# Patient Record
Sex: MALE | Race: WHITE | NOT HISPANIC OR LATINO | Employment: UNEMPLOYED | RURAL
[De-identification: names, ages, dates, MRNs, and addresses within clinical notes are randomized per-mention and may not be internally consistent; named-entity substitution may affect disease eponyms.]

---

## 2020-11-14 ENCOUNTER — HISTORICAL (OUTPATIENT)
Dept: ADMINISTRATIVE | Facility: HOSPITAL | Age: 60
End: 2020-11-14

## 2020-11-14 LAB
ABO: NORMAL
ALBUMIN SERPL BCP-MCNC: 3.4 G/DL (ref 3.5–5)
ALBUMIN/GLOB SERPL: 0.7 {RATIO}
ALP SERPL-CCNC: 160 U/L (ref 45–115)
ALT SERPL W P-5'-P-CCNC: 29 U/L (ref 16–61)
AMPHET UR QL SCN: NEGATIVE NG/ML
ANION GAP SERPL CALCULATED.3IONS-SCNC: 18 MMOL/L
ANTIBODY SCREEN: NORMAL
APTT PPP: 29.8 SECONDS (ref 25.2–37.3)
AST SERPL W P-5'-P-CCNC: 49 U/L (ref 15–37)
BARBITURATES UR QL SCN: NEGATIVE NG/ML
BASOPHILS # BLD AUTO: 0.03 X10E3/UL (ref 0–0.2)
BASOPHILS NFR BLD AUTO: 0.4 % (ref 0–1)
BENZODIAZ METAB UR QL SCN: NEGATIVE NG/ML
BILIRUB SERPL-MCNC: 0.5 MG/DL (ref 0–1.2)
BILIRUB UR QL STRIP: NEGATIVE MG/DL
BUN SERPL-MCNC: 6 MG/DL (ref 7–18)
BUN/CREAT SERPL: 6.2
CALCIUM SERPL-MCNC: 8.7 MG/DL (ref 8.5–10.1)
CANNABINOIDS UR QL SCN: NEGATIVE NG/ML
CHLORIDE SERPL-SCNC: 90 MMOL/L (ref 98–107)
CLARITY UR: CLEAR
CO2 SERPL-SCNC: 23 MMOL/L (ref 21–32)
COCAINE UR QL SCN: NEGATIVE NG/ML
COLOR UR: ABNORMAL
CREAT SERPL-MCNC: 0.97 MG/DL (ref 0.7–1.3)
EOSINOPHIL # BLD AUTO: 0.07 X10E3/UL (ref 0–0.5)
EOSINOPHIL NFR BLD AUTO: 1 % (ref 1–4)
ERYTHROCYTE [DISTWIDTH] IN BLOOD BY AUTOMATED COUNT: 11.3 % (ref 11.5–14.5)
ETHANOL SERPL-MCNC: 83 MG/DL (ref 0–10)
FOLATE SERPL-MCNC: >20 NG/ML (ref 3.1–17.5)
GLOBULIN SER-MCNC: 5.1 G/DL (ref 2–4)
GLUCOSE SERPL-MCNC: 111 MG/DL (ref 70–105)
GLUCOSE SERPL-MCNC: 120 MG/DL (ref 70–105)
GLUCOSE SERPL-MCNC: 128 MG/DL (ref 74–106)
GLUCOSE UR STRIP-MCNC: NEGATIVE MG/DL
HCT VFR BLD AUTO: 29.9 % (ref 40–54)
HGB BLD-MCNC: 10.8 G/DL (ref 13.5–18)
IMM GRANULOCYTES # BLD AUTO: 0.01 X10E3/UL (ref 0–0.04)
IMM GRANULOCYTES NFR BLD: 0.1 % (ref 0–0.4)
INR BLD: 1.04 (ref 0–3.3)
KETONES UR STRIP-SCNC: NEGATIVE MG/DL
LACTATE SERPL-SCNC: 2.4 MMOL/L (ref 0.4–2)
LACTATE SERPL-SCNC: 2.4 MMOL/L (ref 0.4–2)
LEUKOCYTE ESTERASE UR QL STRIP: NEGATIVE LEU/UL
LYMPHOCYTES # BLD AUTO: 1.6 X10E3/UL (ref 1–4.8)
LYMPHOCYTES NFR BLD AUTO: 23.2 % (ref 27–41)
MCH RBC QN AUTO: 36.2 PG (ref 27–31)
MCHC RBC AUTO-ENTMCNC: 36.1 G/DL (ref 32–36)
MCV RBC AUTO: 100.3 FL (ref 80–96)
MONOCYTES # BLD AUTO: 0.74 X10E3/UL (ref 0–0.8)
MONOCYTES NFR BLD AUTO: 10.7 % (ref 2–6)
MPC BLD CALC-MCNC: 9 FL (ref 9.4–12.4)
NEUTROPHILS # BLD AUTO: 4.44 X10E3/UL (ref 1.8–7.7)
NEUTROPHILS NFR BLD AUTO: 64.6 % (ref 53–65)
NITRITE UR QL STRIP: NEGATIVE
NRBC # BLD AUTO: 0 X10E3/UL (ref 0–0)
NRBC, AUTO (.00): 0 /100 (ref 0–0)
OPIATES UR QL SCN: NEGATIVE NG/ML
PCP UR QL SCN: NEGATIVE NG/ML
PH UR STRIP: 6 PH UNITS (ref 5–8)
PLATELET # BLD AUTO: 208 X10E3/UL (ref 150–400)
POTASSIUM SERPL-SCNC: 2.8 MMOL/L (ref 3.5–5.1)
PROLACTIN SERPL-MCNC: 31.5 NG/ML
PROT SERPL-MCNC: 8.5 G/DL (ref 6.4–8.2)
PROT UR QL STRIP: NEGATIVE MG/DL
PROTHROMBIN TIME: 13.1 SECONDS (ref 11.7–14.7)
RBC # BLD AUTO: 2.98 X10E6/UL (ref 4.6–6.2)
RBC # UR STRIP: NEGATIVE ERY/UL
RH TYPE: NORMAL
SARS-COV-2 RNA AMPLIFICATION, QUAL: NEGATIVE
SODIUM SERPL-SCNC: 128 MMOL/L (ref 136–145)
SP GR UR STRIP: 1.01 (ref 1–1.03)
TROPONIN I SERPL-MCNC: <0.017 NG/ML (ref 0–0.06)
TSH SERPL DL<=0.005 MIU/L-ACNC: 2.61 UIU/ML (ref 0.36–3.74)
UROBILINOGEN UR STRIP-ACNC: 0.2 EU/DL
VIT B12 SERPL-MCNC: 199 PG/ML (ref 193–986)
WBC # BLD AUTO: 6.89 X10E3/UL (ref 4.5–11)

## 2020-11-15 ENCOUNTER — HISTORICAL (OUTPATIENT)
Dept: ADMINISTRATIVE | Facility: HOSPITAL | Age: 60
End: 2020-11-15

## 2020-11-15 LAB
ANION GAP SERPL CALCULATED.3IONS-SCNC: 19 MMOL/L
ANISOCYTOSIS BLD QL SMEAR: ABNORMAL
BASOPHILS # BLD AUTO: 0 X10E3/UL (ref 0–0.2)
BASOPHILS NFR BLD AUTO: 0 % (ref 0–1)
BUN SERPL-MCNC: 7 MG/DL (ref 7–18)
CALCIUM SERPL-MCNC: 8.8 MG/DL (ref 8.5–10.1)
CHLORIDE SERPL-SCNC: 97 MMOL/L (ref 98–107)
CHOLEST SERPL-MCNC: 133 MG/DL
CHOLEST/HDLC SERPL: 2.4 {RATIO}
CO2 SERPL-SCNC: 22 MMOL/L (ref 21–32)
CREAT SERPL-MCNC: 0.91 MG/DL (ref 0.7–1.3)
EOSINOPHIL # BLD AUTO: 0 X10E3/UL (ref 0–0.5)
EOSINOPHIL NFR BLD AUTO: 0 % (ref 1–4)
ERYTHROCYTE [DISTWIDTH] IN BLOOD BY AUTOMATED COUNT: 11.6 % (ref 11.5–14.5)
GLUCOSE SERPL-MCNC: 121 MG/DL (ref 74–106)
HCT VFR BLD AUTO: 31.3 % (ref 40–54)
HDLC SERPL-MCNC: 56 MG/DL
HGB BLD-MCNC: 11 G/DL (ref 13.5–18)
IMM GRANULOCYTES # BLD AUTO: 0.02 X10E3/UL (ref 0–0.04)
IMM GRANULOCYTES NFR BLD: 0.4 % (ref 0–0.4)
LDLC SERPL CALC-MCNC: 68 MG/DL
LYMPHOCYTES # BLD AUTO: 0.4 X10E3/UL (ref 1–4.8)
LYMPHOCYTES NFR BLD AUTO: 7.6 % (ref 27–41)
LYMPHOCYTES NFR BLD MANUAL: 6 % (ref 27–41)
MACROCYTES BLD QL SMEAR: ABNORMAL
MAGNESIUM SERPL-MCNC: 1.8 MG/DL (ref 1.7–2.3)
MCH RBC QN AUTO: 36.3 PG (ref 27–31)
MCHC RBC AUTO-ENTMCNC: 35.1 G/DL (ref 32–36)
MCV RBC AUTO: 103.3 FL (ref 80–96)
MONOCYTES # BLD AUTO: 0.06 X10E3/UL (ref 0–0.8)
MONOCYTES NFR BLD AUTO: 1.1 % (ref 2–6)
MONOCYTES NFR BLD MANUAL: 1 % (ref 2–6)
MPC BLD CALC-MCNC: 9.9 FL (ref 9.4–12.4)
NEUTROPHILS # BLD AUTO: 4.75 X10E3/UL (ref 1.8–7.7)
NEUTROPHILS NFR BLD AUTO: 90.9 % (ref 53–65)
NEUTS SEG NFR BLD MANUAL: 93 % (ref 50–62)
NRBC # BLD AUTO: 0 X10E3/UL (ref 0–0)
NRBC, AUTO (.00): 0 /100 (ref 0–0)
PLATELET # BLD AUTO: 198 X10E3/UL (ref 150–400)
PLATELET MORPHOLOGY: NORMAL
POTASSIUM SERPL-SCNC: 4.7 MMOL/L (ref 3.5–5.1)
RBC # BLD AUTO: 3.03 X10E6/UL (ref 4.6–6.2)
SODIUM SERPL-SCNC: 133 MMOL/L (ref 136–145)
TRIGL SERPL-MCNC: 47 MG/DL
WBC # BLD AUTO: 5.23 X10E3/UL (ref 4.5–11)

## 2020-11-16 ENCOUNTER — HISTORICAL (OUTPATIENT)
Dept: ADMINISTRATIVE | Facility: HOSPITAL | Age: 60
End: 2020-11-16

## 2020-11-16 LAB
ANION GAP SERPL CALCULATED.3IONS-SCNC: 15 MMOL/L
ANISOCYTOSIS BLD QL SMEAR: ABNORMAL
BASOPHILS # BLD AUTO: 0.02 X10E3/UL (ref 0–0.2)
BASOPHILS NFR BLD AUTO: 0.1 % (ref 0–1)
BUN SERPL-MCNC: 11 MG/DL (ref 7–18)
CALCIUM SERPL-MCNC: 9.1 MG/DL (ref 8.5–10.1)
CHLORIDE SERPL-SCNC: 94 MMOL/L (ref 98–107)
CO2 SERPL-SCNC: 27 MMOL/L (ref 21–32)
CREAT SERPL-MCNC: 1.04 MG/DL (ref 0.7–1.3)
EOSINOPHIL # BLD AUTO: 0.01 X10E3/UL (ref 0–0.5)
EOSINOPHIL NFR BLD AUTO: 0.1 % (ref 1–4)
ERYTHROCYTE [DISTWIDTH] IN BLOOD BY AUTOMATED COUNT: 11.9 % (ref 11.5–14.5)
GLUCOSE SERPL-MCNC: 114 MG/DL (ref 74–106)
HCT VFR BLD AUTO: 30.4 % (ref 40–54)
HGB BLD-MCNC: 10.3 G/DL (ref 13.5–18)
IMM GRANULOCYTES # BLD AUTO: 0.08 X10E3/UL (ref 0–0.04)
IMM GRANULOCYTES NFR BLD: 0.5 % (ref 0–0.4)
LYMPHOCYTES # BLD AUTO: 0.63 X10E3/UL (ref 1–4.8)
LYMPHOCYTES NFR BLD AUTO: 4.1 % (ref 27–41)
LYMPHOCYTES NFR BLD MANUAL: 2 % (ref 27–41)
MACROCYTES BLD QL SMEAR: ABNORMAL
MAGNESIUM SERPL-MCNC: 1.6 MG/DL (ref 1.7–2.3)
MCH RBC QN AUTO: 34.9 PG (ref 27–31)
MCHC RBC AUTO-ENTMCNC: 33.9 G/DL (ref 32–36)
MCV RBC AUTO: 103.1 FL (ref 80–96)
MONOCYTES # BLD AUTO: 0.53 X10E3/UL (ref 0–0.8)
MONOCYTES NFR BLD AUTO: 3.4 % (ref 2–6)
MONOCYTES NFR BLD MANUAL: 3 % (ref 2–6)
MPC BLD CALC-MCNC: 9.8 FL (ref 9.4–12.4)
NEUTROPHILS # BLD AUTO: 14.22 X10E3/UL (ref 1.8–7.7)
NEUTROPHILS NFR BLD AUTO: 91.8 % (ref 53–65)
NEUTS SEG NFR BLD MANUAL: 95 % (ref 50–62)
NRBC # BLD AUTO: 0 X10E3/UL (ref 0–0)
NRBC, AUTO (.00): 0 /100 (ref 0–0)
PLATELET # BLD AUTO: 198 X10E3/UL (ref 150–400)
PLATELET MORPHOLOGY: NORMAL
POTASSIUM SERPL-SCNC: 3.8 MMOL/L (ref 3.5–5.1)
RBC # BLD AUTO: 2.95 X10E6/UL (ref 4.6–6.2)
SODIUM SERPL-SCNC: 132 MMOL/L (ref 136–145)
WBC # BLD AUTO: 15.49 X10E3/UL (ref 4.5–11)

## 2020-11-19 LAB — VIT B1 BLD-SCNC: 60 NMOL/L (ref 70–180)

## 2022-08-09 ENCOUNTER — OFFICE VISIT (OUTPATIENT)
Dept: CARDIOLOGY | Facility: CLINIC | Age: 62
End: 2022-08-09
Payer: OTHER GOVERNMENT

## 2022-08-09 VITALS
SYSTOLIC BLOOD PRESSURE: 94 MMHG | HEIGHT: 70 IN | BODY MASS INDEX: 19.93 KG/M2 | RESPIRATION RATE: 12 BRPM | HEART RATE: 82 BPM | WEIGHT: 139.25 LBS | OXYGEN SATURATION: 96 % | DIASTOLIC BLOOD PRESSURE: 60 MMHG

## 2022-08-09 DIAGNOSIS — R55 SYNCOPE AND COLLAPSE: Primary | ICD-10-CM

## 2022-08-09 PROCEDURE — 99213 PR OFFICE/OUTPT VISIT, EST, LEVL III, 20-29 MIN: ICD-10-PCS | Mod: S$PBB,,, | Performed by: INTERNAL MEDICINE

## 2022-08-09 PROCEDURE — 99213 OFFICE O/P EST LOW 20 MIN: CPT | Mod: S$PBB,,, | Performed by: INTERNAL MEDICINE

## 2022-08-09 PROCEDURE — 93005 ELECTROCARDIOGRAM TRACING: CPT | Mod: PBBFAC | Performed by: INTERNAL MEDICINE

## 2022-08-09 PROCEDURE — 99215 OFFICE O/P EST HI 40 MIN: CPT | Mod: PBBFAC | Performed by: INTERNAL MEDICINE

## 2022-08-09 PROCEDURE — 93010 ELECTROCARDIOGRAM REPORT: CPT | Mod: S$PBB,,, | Performed by: INTERNAL MEDICINE

## 2022-08-09 PROCEDURE — 93010 EKG 12-LEAD: ICD-10-PCS | Mod: S$PBB,,, | Performed by: INTERNAL MEDICINE

## 2022-08-09 RX ORDER — ASPIRIN 81 MG/1
81 TABLET ORAL DAILY
COMMUNITY
Start: 2022-01-05

## 2022-08-09 RX ORDER — GABAPENTIN 300 MG/1
300 CAPSULE ORAL 2 TIMES DAILY
COMMUNITY
Start: 2022-01-05

## 2022-08-09 RX ORDER — MELATONIN 3 MG
3 CAPSULE ORAL NIGHTLY PRN
COMMUNITY
Start: 2022-01-05

## 2022-08-09 RX ORDER — FERROUS GLUCONATE 324(38)MG
324 TABLET ORAL DAILY
COMMUNITY
Start: 2022-01-05

## 2022-08-09 RX ORDER — 1,2-PENTANEDIOL 100 %
1 LIQUID (ML) MISCELLANEOUS DAILY
COMMUNITY
Start: 2022-01-05

## 2022-08-09 RX ORDER — SERTRALINE HYDROCHLORIDE 100 MG/1
100 TABLET, FILM COATED ORAL DAILY
COMMUNITY
Start: 2022-01-05

## 2022-08-09 RX ORDER — TRAZODONE HYDROCHLORIDE 50 MG/1
50 TABLET ORAL NIGHTLY
COMMUNITY
Start: 2022-01-05

## 2022-08-09 RX ORDER — LOVASTATIN 40 MG/1
20 TABLET ORAL DAILY
COMMUNITY
Start: 2022-01-05

## 2022-08-09 RX ORDER — MIRTAZAPINE 45 MG/1
45 TABLET, FILM COATED ORAL NIGHTLY
COMMUNITY
Start: 2022-01-05

## 2022-08-15 NOTE — PROGRESS NOTES
[facility] note        DATE OF SERVICE: 08/16/2022       PCP: Primary Doctor No      CHIEF COMPLAINT:   Chief Complaint   Patient presents with    [facility]     [facility]     Loss of Consciousness     Numerous times over the last 2 years.     Shortness of Breath     All the time.         HISTORY OF PRESENT ILLNESS:  Troy Kendrick is a 62 y.o. male with a PMH of   Past Medical History:   Diagnosis Date    Coronary artery disease     Hyperlipidemia     Syncope and collapse      who presents for   Chief Complaint   Patient presents with    [facility]     [facility]     Loss of Consciousness     Numerous times over the last 2 years.     Shortness of Breath     All the time.           Review of Systems: Review of Systems   Respiratory: Positive for shortness of breath.    Cardiovascular: Negative for chest pain, palpitations and leg swelling.   Neurological: Positive for loss of consciousness.        PAST MEDICAL HISTORY:  Past Medical History:   Diagnosis Date    Coronary artery disease     Hyperlipidemia     Syncope and collapse        PAST SURGICAL HISTORY:  Past Surgical History:   Procedure Laterality Date    CORONARY ARTERY BYPASS GRAFT      LEFT HEART CATHETERIZATION  2018 -[place]       SOCIAL HISTORY:  Social History     Socioeconomic History    Marital status:        FAMILY HISTORY:  No family history on file.      ALLERGIES:  Review of patient's allergies indicates:  No Known Allergies     MEDICATIONS:    Current Outpatient Medications:     1,2-pentanediol, bulk, 100 % Liqd, Take 1 mg by mouth once daily at 6am., Disp: , Rfl:     aspirin (ECOTRIN) 81 MG EC tablet, Take 81 mg by mouth once daily at 6am., Disp: , Rfl:     ferrous gluconate (FERGON) 324 MG tablet, Take 324 mg by mouth once daily at 6am., Disp: , Rfl:     gabapentin (NEURONTIN) 300 MG capsule, Take 300 mg by mouth 2 (two) times a day., Disp: , Rfl:     lovastatin (MEVACOR)  "40 MG tablet, Take 20 mg by mouth once daily at 6am., Disp: , Rfl:     melatonin 3 mg Cap, Take 3 mg by mouth nightly as needed., Disp: , Rfl:     metoprolol succinate 100 mg CSpX, Take 50 mg by mouth once daily at 6am., Disp: , Rfl:     mirtazapine (REMERON) 45 MG tablet, Take 45 mg by mouth every evening., Disp: , Rfl:     sertraline (ZOLOFT) 100 MG tablet, Take 100 mg by mouth once daily at 6am., Disp: , Rfl:     traZODone (DESYREL) 50 MG tablet, Take 50 mg by mouth every evening., Disp: , Rfl:      PHYSICAL EXAM:  BP 94/60 (BP Location: Left arm, Patient Position: Standing, BP Method: Medium (Manual))   Pulse 82   Resp 12   Ht 5' 10" (1.778 m)   Wt 63.2 kg (139 lb 4 oz)   SpO2 96%   BMI 19.98 kg/m²   Wt Readings from Last 3 Encounters:   08/09/22 63.2 kg (139 lb 4 oz)      Body mass index is 19.98 kg/m².    Physical Exam  Vitals reviewed.   Constitutional:       Appearance: Normal appearance.   HENT:      Head: Normocephalic and atraumatic.   Neck:      Vascular: No carotid bruit or JVD.   Cardiovascular:      Rate and Rhythm: Normal rate and regular rhythm.      Pulses:           Radial pulses are 1+ on the right side and 1+ on the left side.        Dorsalis pedis pulses are 1+ on the right side and 1+ on the left side.      Heart sounds: Normal heart sounds.   Pulmonary:      Effort: Pulmonary effort is normal.      Breath sounds: Normal breath sounds.   Musculoskeletal:      Right lower leg: No edema.      Left lower leg: No edema.   Skin:     General: Skin is warm and dry.   Neurological:      Mental Status: He is alert.         LABS REVIEWED:  Lab Results   Component Value Date    WBC 15.49 (H) 11/16/2020    RBC 2.95 (L) 11/16/2020    HGB 10.3 (L) 11/16/2020    HCT 30.4 (L) 11/16/2020    .1 (H) 11/16/2020    MCH 34.9 (H) 11/16/2020    MCHC 33.9 11/16/2020    RDW 11.9 11/16/2020     11/16/2020    MPV 9.8 11/16/2020    NRBC 0.0 11/16/2020    INR 1.04 11/14/2020     Lab Results "   Component Value Date     (L) 11/16/2020    K 3.8 11/16/2020    CL 94 (L) 11/16/2020    CO2 27 11/16/2020    BUN 11 11/16/2020    MG 1.6 (L) 11/16/2020     Lab Results   Component Value Date    AST 49 (H) 11/14/2020    ALT 29 11/14/2020     Lab Results   Component Value Date     (H) 11/16/2020     Lab Results   Component Value Date    CHOL 133 11/15/2020    HDL 56 11/15/2020    TRIG 47 11/15/2020    CHOLHDL 2.4 11/15/2020       CARDIAC STUDIES REVIEWED:EKG: NSR, septal infarct-age undetermined, 72 bpm    ASSESSMENT: c/o orthostatic dizziness    VISIT DIAGNOSIS:  Syncope and collapse  -     EKG 12-lead; Future  -     Echo; Future; Expected date: 08/23/2022  -     X-Ray Chest PA And Lateral; Future; Expected date: 08/09/2022  -     NM Myocardial Perfusion Spect Multi Pharmacologic; Future; Expected date: 08/23/2022  -     Nuclear Stress Test; Future; Expected date: 08/23/2022  -     CBC Auto Differential; Future; Expected date: 08/09/2022  -     Comprehensive Metabolic Panel; Future; Expected date: 08/09/2022  -     TSH; Future; Expected date: 08/09/2022  -     Hemoglobin A1C; Future; Expected date: 08/09/2022  -     Urinalysis, Reflex to Urine Culture; Future; Expected date: 08/09/2022         PLAN: orthostatic vs  Orders Placed This Encounter   Procedures    X-Ray Chest PA And Lateral     Standing Status:   Future     Standing Expiration Date:   8/9/2023     Order Specific Question:   May the Radiologist modify the order per protocol to meet the clinical needs of the patient?     Answer:   Yes     Order Specific Question:   Release to patient     Answer:   Immediate    NM Myocardial Perfusion Spect Multi Pharmacologic     Standing Status:   Future     Standing Expiration Date:   9/9/2022     Order Specific Question:   May the Radiologist modify the order per protocol to meet the clinical needs of the patient?     Answer:   Yes     Order Specific Question:   Stress Medication to use:     Answer:    Regadenoson     Order Specific Question:   Diabetes?     Answer:   No     Order Specific Question:   Will a Cardiologist read this study?     Answer:   Yes    CBC Auto Differential     Standing Status:   Future     Standing Expiration Date:   10/8/2023    Comprehensive Metabolic Panel     Standing Status:   Future     Standing Expiration Date:   10/8/2023    TSH     Standing Status:   Future     Standing Expiration Date:   10/8/2023    Hemoglobin A1C     Standing Status:   Future     Standing Expiration Date:   10/8/2023    Urinalysis, Reflex to Urine Culture     Standing Status:   Future     Standing Expiration Date:   10/8/2023     Order Specific Question:   Specimen Source     Answer:   Urine    Nuclear Stress Test     Standing Status:   Future     Standing Expiration Date:   9/9/2022     Order Specific Question:   Which stress agent will be used?     Answer:   Pharm     Order Specific Question:   Which medicaton for the stress procedure?     Answer:   Regadenoson     Order Specific Question:   Physician to read study:     Answer:   GREGORY GRIFFIN [[id number]]     Order Specific Question:   Release to patient     Answer:   Immediate    EKG 12-lead     Standing Status:   Future     Number of Occurrences:   1     Standing Expiration Date:   8/9/2023    Echo     Standing Status:   Future     Standing Expiration Date:   9/9/2022     Order Specific Question:   Color Doppler?     Answer:   Yes     Order Specific Question:   Release to patient     Answer:   Immediate      RTC after tests.

## 2022-08-16 PROBLEM — R55 SYNCOPE AND COLLAPSE: Status: ACTIVE | Noted: 2022-08-16

## 2022-08-18 ENCOUNTER — HOSPITAL ENCOUNTER (OUTPATIENT)
Dept: CARDIOLOGY | Facility: HOSPITAL | Age: 62
Discharge: HOME | End: 2022-08-18
Attending: INTERNAL MEDICINE
Payer: OTHER GOVERNMENT

## 2022-08-18 ENCOUNTER — HOSPITAL ENCOUNTER (OUTPATIENT)
Dept: RADIOLOGY | Facility: HOSPITAL | Age: 62
Discharge: HOME | End: 2022-08-18
Attending: INTERNAL MEDICINE
Payer: OTHER GOVERNMENT

## 2022-08-18 VITALS — WEIGHT: 139 LBS | BODY MASS INDEX: 19.9 KG/M2 | HEIGHT: 70 IN

## 2022-08-18 DIAGNOSIS — R55 SYNCOPE AND COLLAPSE: ICD-10-CM

## 2022-08-18 LAB
CV STRESS BASE HR: 71 BPM
DIASTOLIC BLOOD PRESSURE: 94 MMHG
OHS CV CPX 1 MINUTE RECOVERY HEART RATE: 71 BPM
OHS CV CPX 85 PERCENT MAX PREDICTED HEART RATE MALE: 133
OHS CV CPX MAX PREDICTED HEART RATE: 156
OHS CV CPX PATIENT IS FEMALE: 0
OHS CV CPX PATIENT IS MALE: 1
OHS CV CPX PEAK DIASTOLIC BLOOD PRESSURE: 96 MMHG
OHS CV CPX PEAK HEAR RATE: 81 BPM
OHS CV CPX PEAK RATE PRESSURE PRODUCT: NORMAL
OHS CV CPX PEAK SYSTOLIC BLOOD PRESSURE: 143 MMHG
OHS CV CPX PERCENT MAX PREDICTED HEART RATE ACHIEVED: 52
OHS CV CPX RATE PRESSURE PRODUCT PRESENTING: NORMAL
SYSTOLIC BLOOD PRESSURE: 164 MMHG

## 2022-08-18 PROCEDURE — 93016 NUCLEAR STRESS TEST (CUPID ONLY): ICD-10-PCS | Mod: ,,, | Performed by: INTERNAL MEDICINE

## 2022-08-18 PROCEDURE — 71046 X-RAY EXAM CHEST 2 VIEWS: CPT | Mod: TC

## 2022-08-18 PROCEDURE — 63600175 PHARM REV CODE 636 W HCPCS: Performed by: INTERNAL MEDICINE

## 2022-08-18 PROCEDURE — 78452 NM MYOCARDIAL PERFUSION SPECT MULTI PHARM: ICD-10-PCS | Mod: 26,,, | Performed by: INTERNAL MEDICINE

## 2022-08-18 PROCEDURE — A9500 TC99M SESTAMIBI: HCPCS

## 2022-08-18 PROCEDURE — 93016 CV STRESS TEST SUPVJ ONLY: CPT | Mod: ,,, | Performed by: INTERNAL MEDICINE

## 2022-08-18 PROCEDURE — 93306 TTE W/DOPPLER COMPLETE: CPT | Mod: 26,,, | Performed by: INTERNAL MEDICINE

## 2022-08-18 PROCEDURE — 78452 HT MUSCLE IMAGE SPECT MULT: CPT | Mod: 26,,, | Performed by: INTERNAL MEDICINE

## 2022-08-18 PROCEDURE — 93018 NUCLEAR STRESS TEST (CUPID ONLY): ICD-10-PCS | Mod: ,,, | Performed by: INTERNAL MEDICINE

## 2022-08-18 PROCEDURE — 93306 TTE W/DOPPLER COMPLETE: CPT

## 2022-08-18 PROCEDURE — 93018 CV STRESS TEST I&R ONLY: CPT | Mod: ,,, | Performed by: INTERNAL MEDICINE

## 2022-08-18 PROCEDURE — 93306 ECHO (CUPID ONLY): ICD-10-PCS | Mod: 26,,, | Performed by: INTERNAL MEDICINE

## 2022-08-18 PROCEDURE — 93017 CV STRESS TEST TRACING ONLY: CPT

## 2022-08-18 RX ORDER — REGADENOSON 0.08 MG/ML
0.4 INJECTION, SOLUTION INTRAVENOUS ONCE
Status: COMPLETED | OUTPATIENT
Start: 2022-08-18 | End: 2022-08-18

## 2022-08-18 RX ADMIN — REGADENOSON 0.4 MG: 0.08 INJECTION, SOLUTION INTRAVENOUS at 09:08

## 2022-08-23 LAB
AORTIC VALVE CUSP SEPERATION: 17.7 CM
AV INDEX (PROSTH): 0.84
AV MEAN GRADIENT: 2 MMHG
AV PEAK GRADIENT: 4 MMHG
AV VALVE AREA: 2.65 CM2
BSA FOR ECHO PROCEDURE: 1.76 M2
CV ECHO LV RWT: 0.62 CM
DOP CALC AO PEAK VEL: 1 M/S
DOP CALC AO VTI: 20.5 CM
DOP CALC LVOT AREA: 3.2 CM2
DOP CALC LVOT DIAMETER: 2.01 CM
DOP CALC LVOT STROKE VOLUME: 54.39 CM3
DOP CALCLVOT PEAK VEL VTI: 17.15 CM
E WAVE DECELERATION TIME: 142 MSEC
E/A RATIO: 1.02
E/E' RATIO: 4.92 M/S
ECHO LV POSTERIOR WALL: 0.95 CM (ref 0.6–1.1)
EJECTION FRACTION: 55 %
FRACTIONAL SHORTENING: 33 % (ref 28–44)
INTERVENTRICULAR SEPTUM: 1.07 CM (ref 0.6–1.1)
IVC DIAMETER: 1.13 CM
LEFT ATRIUM SIZE: 2.68 CM
LEFT INTERNAL DIMENSION IN SYSTOLE: 2.07 CM (ref 2.1–4)
LEFT VENTRICLE DIASTOLIC VOLUME INDEX: 20.85 ML/M2
LEFT VENTRICLE DIASTOLIC VOLUME: 37.32 ML
LEFT VENTRICLE MASS INDEX: 48 G/M2
LEFT VENTRICLE SYSTOLIC VOLUME INDEX: 7.8 ML/M2
LEFT VENTRICLE SYSTOLIC VOLUME: 13.89 ML
LEFT VENTRICULAR INTERNAL DIMENSION IN DIASTOLE: 3.08 CM (ref 3.5–6)
LEFT VENTRICULAR MASS: 86.66 G
LV LATERAL E/E' RATIO: 4.92 M/S
LV SEPTAL E/E' RATIO: 4.92 M/S
MV PEAK A VEL: 0.63 M/S
MV PEAK E VEL: 0.64 M/S
TDI LATERAL: 0.13 M/S
TDI SEPTAL: 0.13 M/S
TDI: 0.13 M/S

## 2022-09-01 ENCOUNTER — OFFICE VISIT (OUTPATIENT)
Dept: CARDIOLOGY | Facility: CLINIC | Age: 62
End: 2022-09-01
Payer: OTHER GOVERNMENT

## 2022-09-01 VITALS
WEIGHT: 143.5 LBS | RESPIRATION RATE: 20 BRPM | BODY MASS INDEX: 20.54 KG/M2 | HEART RATE: 88 BPM | DIASTOLIC BLOOD PRESSURE: 72 MMHG | HEIGHT: 70 IN | SYSTOLIC BLOOD PRESSURE: 110 MMHG

## 2022-09-01 DIAGNOSIS — R07.9 CHEST PAIN, UNSPECIFIED TYPE: Primary | ICD-10-CM

## 2022-09-01 DIAGNOSIS — K21.9 GASTROESOPHAGEAL REFLUX DISEASE WITHOUT ESOPHAGITIS: Chronic | ICD-10-CM

## 2022-09-01 DIAGNOSIS — E78.5 HYPERLIPIDEMIA, UNSPECIFIED HYPERLIPIDEMIA TYPE: Chronic | ICD-10-CM

## 2022-09-01 PROCEDURE — 99213 OFFICE O/P EST LOW 20 MIN: CPT | Mod: PBBFAC | Performed by: INTERNAL MEDICINE

## 2022-09-01 PROCEDURE — 99213 OFFICE O/P EST LOW 20 MIN: CPT | Mod: S$PBB,,, | Performed by: INTERNAL MEDICINE

## 2022-09-01 PROCEDURE — 99213 PR OFFICE/OUTPT VISIT, EST, LEVL III, 20-29 MIN: ICD-10-PCS | Mod: S$PBB,,, | Performed by: INTERNAL MEDICINE

## 2022-09-06 PROBLEM — K21.9 GASTROESOPHAGEAL REFLUX DISEASE WITHOUT ESOPHAGITIS: Chronic | Status: ACTIVE | Noted: 2022-09-06

## 2022-09-06 PROBLEM — E78.5 HYPERLIPIDEMIA: Chronic | Status: ACTIVE | Noted: 2022-09-06

## 2022-09-06 PROBLEM — R07.9 CHEST PAIN: Status: ACTIVE | Noted: 2022-09-06

## 2022-09-06 NOTE — PROGRESS NOTES
[facility] note        DATE OF SERVICE: 09/06/2022       PCP: Primary Doctor No      CHIEF COMPLAINT:   Chief Complaint   Patient presents with    Results    Chest Pain     At times, with rest and exertion.     Shortness of Breath     All the time.    Pre Syncope     Several times since last visit.        HISTORY OF PRESENT ILLNESS:  Troy Kendrick is a 62 y.o. male with a PMH of   Past Medical History:   Diagnosis Date    Hyperlipidemia     Syncope and collapse      who presents for   Chief Complaint   Patient presents with    Results    Chest Pain     At times, with rest and exertion.     Shortness of Breath     All the time.    Pre Syncope     Several times since last visit.          Review of Systems: Review of Systems   Respiratory:  Positive for shortness of breath.    Cardiovascular:  Positive for chest pain. Negative for palpitations and leg swelling.   Neurological:  Negative for loss of consciousness.      PAST MEDICAL HISTORY:  Past Medical History:   Diagnosis Date    Hyperlipidemia     Syncope and collapse        PAST SURGICAL HISTORY:  Past Surgical History:   Procedure Laterality Date    ELBOW SURGERY Left     HERNIA REPAIR      NECK SURGERY         SOCIAL HISTORY:  Social History     Socioeconomic History    Marital status:        FAMILY HISTORY:  Family History   Problem Relation Age of Onset    Heart disease Father     Alcohol abuse Brother          ALLERGIES:  Review of patient's allergies indicates:  No Known Allergies     MEDICATIONS:    Current Outpatient Medications:     1,2-pentanediol, bulk, 100 % Liqd, Take 1 mg by mouth once daily at 6am., Disp: , Rfl:     aspirin (ECOTRIN) 81 MG EC tablet, Take 81 mg by mouth once daily at 6am., Disp: , Rfl:     ferrous gluconate (FERGON) 324 MG tablet, Take 324 mg by mouth once daily at 6am., Disp: , Rfl:     gabapentin (NEURONTIN) 300 MG capsule, Take 300 mg by mouth 2 (two) times a day., Disp: , Rfl:     lovastatin (MEVACOR) 40 MG  "tablet, Take 20 mg by mouth once daily at 6am., Disp: , Rfl:     melatonin 3 mg Cap, Take 3 mg by mouth nightly as needed., Disp: , Rfl:     metoprolol succinate 100 mg CSpX, Take 50 mg by mouth once daily at 6am., Disp: , Rfl:     mirtazapine (REMERON) 45 MG tablet, Take 45 mg by mouth every evening., Disp: , Rfl:     sertraline (ZOLOFT) 100 MG tablet, Take 100 mg by mouth once daily at 6am., Disp: , Rfl:     traZODone (DESYREL) 50 MG tablet, Take 50 mg by mouth every evening., Disp: , Rfl:      PHYSICAL EXAM:  /72 (BP Location: Left arm, Patient Position: Sitting)   Pulse 88   Resp 20   Ht 5' 10" (1.778 m)   Wt 65.1 kg (143 lb 8 oz)   BMI 20.59 kg/m²   Wt Readings from Last 3 Encounters:   09/01/22 65.1 kg (143 lb 8 oz)   08/18/22 63 kg (139 lb)   08/09/22 63.2 kg (139 lb 4 oz)      Body mass index is 20.59 kg/m².    Physical Exam  Vitals reviewed.   Constitutional:       Appearance: Normal appearance.   HENT:      Head: Normocephalic and atraumatic.   Neck:      Vascular: No carotid bruit or JVD.   Cardiovascular:      Rate and Rhythm: Normal rate and regular rhythm.      Pulses: Normal pulses.           Radial pulses are 2+ on the right side and 2+ on the left side.        Dorsalis pedis pulses are 2+ on the right side and 2+ on the left side.      Heart sounds: Normal heart sounds.   Pulmonary:      Effort: Pulmonary effort is normal.      Breath sounds: Normal breath sounds.   Musculoskeletal:      Right lower leg: No edema.      Left lower leg: No edema.   Skin:     General: Skin is warm and dry.   Neurological:      Mental Status: He is alert.     LABS REVIEWED:  Lab Results   Component Value Date    WBC 5.99 08/18/2022    RBC 3.21 (L) 08/18/2022    HGB 11.9 (L) 08/18/2022    HCT 34.7 (L) 08/18/2022    .1 (H) 08/18/2022    MCH 37.1 (H) 08/18/2022    MCHC 34.3 08/18/2022    RDW 13.9 08/18/2022     08/18/2022    MPV 10.2 08/18/2022    NRBC 0.0 08/18/2022    INR 1.04 11/14/2020     Lab " Results   Component Value Date     08/18/2022    K 4.2 08/18/2022    CL 98 08/18/2022    CO2 28 08/18/2022    BUN 5 (L) 08/18/2022    MG 1.6 (L) 11/16/2020     Lab Results   Component Value Date    AST 54 (H) 08/18/2022    ALT 32 08/18/2022     Lab Results   Component Value Date     (H) 08/18/2022    HGBA1C 4.2 (L) 08/18/2022     Lab Results   Component Value Date    CHOL 133 11/15/2020    HDL 56 11/15/2020    TRIG 47 11/15/2020    CHOLHDL 2.4 11/15/2020       CARDIAC STUDIES REVIEWED:Results for orders placed during the hospital encounter of 08/18/22    Echo    Interpretation Summary  · The left ventricle is normal in size with normal systolic function.  · The estimated ejection fraction is 55%.  · Normal left ventricular diastolic function.  · Mild mitral regurgitation.  · Mild pulmonic regurgitation.  · Normal right ventricular size with normal right ventricular systolic function.     Lexiscan:    1. Normal myocardial perfusion normal myocardial perfusion scan with no evidence of ischemia.  2. Normal left ventricular normal left ventricular size with hyperdynamic left size with hyperdynamic left ventricular ventricular systolic function systolic function, ejection, ejection fraction 94%.  Fraction 94%..        ASSESSMENT:   Active Problem List with Overview Notes    Diagnosis Date Noted    Hyperlipidemia 09/06/2022    Gastroesophageal reflux disease without esophagitis 09/06/2022    Chest pain 09/06/2022     with negative stress testing.       Syncope and collapse 08/16/2022     VISIT DIAGNOSIS:  Chest pain, unspecified type  Comments:  with negative stress testing.     Gastroesophageal reflux disease without esophagitis    Hyperlipidemia, unspecified hyperlipidemia type       PLAN:  Mylanta 30 cc AC and HS.     RTC 6 months.

## 2022-09-30 ENCOUNTER — OFFICE VISIT (OUTPATIENT)
Dept: NEUROLOGY | Facility: CLINIC | Age: 62
End: 2022-09-30
Payer: OTHER GOVERNMENT

## 2022-09-30 VITALS
SYSTOLIC BLOOD PRESSURE: 110 MMHG | BODY MASS INDEX: 20.62 KG/M2 | WEIGHT: 144 LBS | OXYGEN SATURATION: 87 % | HEIGHT: 70 IN | DIASTOLIC BLOOD PRESSURE: 68 MMHG | HEART RATE: 78 BPM

## 2022-09-30 DIAGNOSIS — R55 SYNCOPE AND COLLAPSE: ICD-10-CM

## 2022-09-30 DIAGNOSIS — R53.81 PHYSICAL DECONDITIONING: Primary | ICD-10-CM

## 2022-09-30 PROCEDURE — 99204 PR OFFICE/OUTPT VISIT, NEW, LEVL IV, 45-59 MIN: ICD-10-PCS | Mod: S$PBB,,, | Performed by: PSYCHIATRY & NEUROLOGY

## 2022-09-30 PROCEDURE — 99215 OFFICE O/P EST HI 40 MIN: CPT | Mod: PBBFAC | Performed by: PSYCHIATRY & NEUROLOGY

## 2022-09-30 PROCEDURE — 99204 OFFICE O/P NEW MOD 45 MIN: CPT | Mod: S$PBB,,, | Performed by: PSYCHIATRY & NEUROLOGY

## 2022-09-30 RX ORDER — FERROUS SULFATE 324(65)MG
324 TABLET, DELAYED RELEASE (ENTERIC COATED) ORAL
COMMUNITY
Start: 2022-02-25

## 2022-09-30 RX ORDER — ERGOCALCIFEROL 1.25 MG/1
1 CAPSULE ORAL WEEKLY
COMMUNITY
Start: 2022-08-25

## 2022-09-30 RX ORDER — FOLIC ACID 1 MG/1
1 TABLET ORAL
COMMUNITY
Start: 2022-01-27

## 2022-09-30 RX ORDER — CYANOCOBALAMIN 1000 UG/ML
INJECTION, SOLUTION INTRAMUSCULAR; SUBCUTANEOUS
COMMUNITY
Start: 2022-08-25

## 2022-09-30 RX ORDER — OMEPRAZOLE 40 MG/1
40 CAPSULE, DELAYED RELEASE ORAL
COMMUNITY
Start: 2022-08-25

## 2022-09-30 NOTE — PROGRESS NOTES
Subjective:       Patient ID: Troy Kendrick is a 62 y.o. male     Chief Complaint:    Chief Complaint   Patient presents with    Fall    Loss of Consciousness        Allergies:  Patient has no known allergies.    Current Medications:    Outpatient Encounter Medications as of 9/30/2022   Medication Sig Dispense Refill    1,2-pentanediol, bulk, 100 % Liqd Take 1 mg by mouth once daily at 6am.      aspirin (ECOTRIN) 81 MG EC tablet Take 81 mg by mouth once daily at 6am.      cyanocobalamin 1,000 mcg/mL injection INJECT 1ML INTRAMUSCULARLY MONTHLY FOR ANEMIA      ergocalciferol (ERGOCALCIFEROL) 50,000 unit Cap Take 1 capsule by mouth once a week.      ferrous gluconate (FERGON) 324 MG tablet Take 324 mg by mouth once daily at 6am.      ferrous sulfate 324 mg (65 mg iron) TbEC 324 mg.      folic acid (FOLVITE) 1 MG tablet 1 mg.      gabapentin (NEURONTIN) 300 MG capsule Take 300 mg by mouth 2 (two) times a day.      lovastatin (MEVACOR) 40 MG tablet Take 20 mg by mouth once daily at 6am.      melatonin 3 mg Cap Take 3 mg by mouth nightly as needed.      metoprolol succinate 100 mg CSpX Take 50 mg by mouth once daily at 6am.      mirtazapine (REMERON) 45 MG tablet Take 45 mg by mouth every evening.      omeprazole (PRILOSEC) 40 MG capsule 40 mg.      sertraline (ZOLOFT) 100 MG tablet Take 100 mg by mouth once daily at 6am.      traZODone (DESYREL) 50 MG tablet Take 50 mg by mouth every evening.       No facility-administered encounter medications on file as of 9/30/2022.       History of Present Illness  62-year-old former  referred to clinic by the [facility] for two years syncope and collapse-patient has had several episodes of syncope with collapse seen over the last year so with full loss of consciousness  Patient has had a full and extensive cardiovascular workup per Dr. Amato with normal EKG normal echo and normal myocardial perfusion scan   Pt describes symptoms as dizziness,  "lightheadedness prior to completely passing out   Pt also had ACDF and lumbar surgery per Dr Nigel Swift about two yrs ago prior to onset of symptoms - pt feels all his symptoms have worsened   Recent MRI brain from [place] showed nothing acute but some PERIVENTRICULAR WHITE MATTER lesions and poss old areasa of ischemia   Pt has drinking ETOH to excess since age 11 -he also admits to abuse of cocaine and other illicit substances for many years ands young adult which may have caused the above head   Also w/ neuropathic pains and on Blanquita 300 mg bid w/ no relief                  Past Medical History:   Diagnosis Date    Hyperlipidemia     Syncope and collapse        Past Surgical History:   Procedure Laterality Date    ELBOW SURGERY Left     HERNIA REPAIR      NECK SURGERY         Social History  Mr. Kendrick  reports that he has never smoked. He has never used smokeless tobacco.    Family History  's [place] family history includes Alcohol abuse in his brother; Heart disease in his father.    Review of Systems  Review of Systems   Musculoskeletal:  Positive for back pain.   Neurological:  Positive for dizziness, tingling and weakness.   Psychiatric/Behavioral:  Positive for depression. The patient is nervous/anxious.    All other systems reviewed and are negative.   Objective:   /68 (BP Location: Left arm, Patient Position: Sitting, BP Method: Medium (Manual))   Pulse 78   Ht 5' 10" (1.778 m)   Wt 65.3 kg (144 lb)   SpO2 (!) 87%   BMI 20.66 kg/m²    NEUROLOGICAL EXAMINATION:     MENTAL STATUS   Oriented to person, place, and time.   Level of consciousness: alert  Knowledge: consistent with education.     CRANIAL NERVES   Cranial nerves II through XII intact.     MOTOR EXAM   Muscle bulk: decreased  Overall muscle tone: decreased       In wheelchair but can walk short distance - grossly deconditoned   MARU muscles - atrophied        REFLEXES     Reflexes   Right brachioradialis: 1+  Left brachioradialis: " 1+  Right biceps: 1+  Left biceps: 1+  Right triceps: 1+  Left triceps: 1+  Right patellar: 1+  Left patellar: 1+  Right achilles: 1+  Left achilles: 1+  Right : 1+  Left : 1+    GAIT AND COORDINATION        In wheelchair but grossly deconditioned      Physical Exam  Vitals reviewed.   Neurological:      Mental Status: He is alert and oriented to person, place, and time. Mental status is at baseline.      Cranial Nerves: Cranial nerves 2-12 are intact.      Deep Tendon Reflexes:      Reflex Scores:       Tricep reflexes are 1+ on the right side and 1+ on the left side.       Bicep reflexes are 1+ on the right side and 1+ on the left side.       Brachioradialis reflexes are 1+ on the right side and 1+ on the left side.       Patellar reflexes are 1+ on the right side and 1+ on the left side.       Achilles reflexes are 1+ on the right side and 1+ on the left side.       Assessment:     Syncope and collapse  Comments:  cardiac w/u negative recently   ETOH abuse exacerbates likely       Primary Diagnosis and ICD10  Syncope and collapse [R55]    Plan:     Patient Instructions   Caution w/ excess meds  Taper off ETOH slowly- poss culprit for his syncopal spells  Needs PT/Rehab for strenghtening and recondtioned   Nothing further to offer from Neuro perspective     There are no discontinued medications.    Requested Prescriptions      No prescriptions requested or ordered in this encounter

## 2022-09-30 NOTE — PATIENT INSTRUCTIONS
Caution w/ excess meds  Taper off ETOH slowly- poss culprit for his syncopal spells  Needs PT/Rehab for strenghtening and recondtioned   Nothing further to offer from Neuro perspective

## 2023-09-09 ENCOUNTER — OUTSIDE PLACE OF SERVICE (OUTPATIENT)
Dept: CARDIOLOGY | Facility: HOSPITAL | Age: 63
End: 2023-09-09
Payer: OTHER GOVERNMENT

## 2023-09-09 PROCEDURE — 93010 ELECTROCARDIOGRAM REPORT: CPT | Mod: ,,, | Performed by: INTERNAL MEDICINE

## 2023-09-09 PROCEDURE — 93010 PR ELECTROCARDIOGRAM REPORT: ICD-10-PCS | Mod: ,,, | Performed by: INTERNAL MEDICINE
